# Patient Record
Sex: MALE | Race: BLACK OR AFRICAN AMERICAN | URBAN - METROPOLITAN AREA
[De-identification: names, ages, dates, MRNs, and addresses within clinical notes are randomized per-mention and may not be internally consistent; named-entity substitution may affect disease eponyms.]

---

## 2017-05-10 ENCOUNTER — HOSPITAL ENCOUNTER (EMERGENCY)
Facility: CLINIC | Age: 37
Discharge: HOME | End: 2017-05-10
Attending: EMERGENCY MEDICINE | Admitting: EMERGENCY MEDICINE

## 2017-05-10 VITALS
HEART RATE: 80 BPM | DIASTOLIC BLOOD PRESSURE: 70 MMHG | TEMPERATURE: 96.7 F | SYSTOLIC BLOOD PRESSURE: 121 MMHG | RESPIRATION RATE: 18 BRPM | OXYGEN SATURATION: 97 %

## 2017-05-10 DIAGNOSIS — E11.65 TYPE 2 DIABETES MELLITUS WITH HYPERGLYCEMIA, WITHOUT LONG-TERM CURRENT USE OF INSULIN (H): ICD-10-CM

## 2017-05-10 LAB
ALBUMIN SERPL-MCNC: 3.8 G/DL (ref 3.4–5)
ALP SERPL-CCNC: 102 U/L (ref 40–150)
ALT SERPL W P-5'-P-CCNC: 32 U/L (ref 0–70)
ANION GAP SERPL CALCULATED.3IONS-SCNC: 8 MMOL/L (ref 3–14)
AST SERPL W P-5'-P-CCNC: 21 U/L (ref 0–45)
BASOPHILS # BLD AUTO: 0 10E9/L (ref 0–0.2)
BASOPHILS NFR BLD AUTO: 0.4 %
BILIRUB SERPL-MCNC: 0.6 MG/DL (ref 0.2–1.3)
BUN SERPL-MCNC: 15 MG/DL (ref 7–30)
CALCIUM SERPL-MCNC: 9.3 MG/DL (ref 8.5–10.1)
CHLORIDE SERPL-SCNC: 99 MMOL/L (ref 94–109)
CO2 SERPL-SCNC: 29 MMOL/L (ref 20–32)
CREAT SERPL-MCNC: 0.91 MG/DL (ref 0.66–1.25)
DIFFERENTIAL METHOD BLD: ABNORMAL
EOSINOPHIL # BLD AUTO: 0.1 10E9/L (ref 0–0.7)
EOSINOPHIL NFR BLD AUTO: 1.4 %
ERYTHROCYTE [DISTWIDTH] IN BLOOD BY AUTOMATED COUNT: 13.2 % (ref 10–15)
GFR SERPL CREATININE-BSD FRML MDRD: ABNORMAL ML/MIN/1.7M2
GLUCOSE BLDC GLUCOMTR-MCNC: 268 MG/DL (ref 70–99)
GLUCOSE SERPL-MCNC: 284 MG/DL (ref 70–99)
HCT VFR BLD AUTO: 40.5 % (ref 40–53)
HGB BLD-MCNC: 13.8 G/DL (ref 13.3–17.7)
IMM GRANULOCYTES # BLD: 0 10E9/L (ref 0–0.4)
IMM GRANULOCYTES NFR BLD: 0.2 %
LYMPHOCYTES # BLD AUTO: 2.4 10E9/L (ref 0.8–5.3)
LYMPHOCYTES NFR BLD AUTO: 46.6 %
MCH RBC QN AUTO: 26.7 PG (ref 26.5–33)
MCHC RBC AUTO-ENTMCNC: 34.1 G/DL (ref 31.5–36.5)
MCV RBC AUTO: 78 FL (ref 78–100)
MONOCYTES # BLD AUTO: 0.4 10E9/L (ref 0–1.3)
MONOCYTES NFR BLD AUTO: 7.8 %
NEUTROPHILS # BLD AUTO: 2.3 10E9/L (ref 1.6–8.3)
NEUTROPHILS NFR BLD AUTO: 43.6 %
NRBC # BLD AUTO: 0 10*3/UL
NRBC BLD AUTO-RTO: 0 /100
PLATELET # BLD AUTO: 98 10E9/L (ref 150–450)
POTASSIUM SERPL-SCNC: 3.8 MMOL/L (ref 3.4–5.3)
PROT SERPL-MCNC: 8.4 G/DL (ref 6.8–8.8)
RBC # BLD AUTO: 5.17 10E12/L (ref 4.4–5.9)
SODIUM SERPL-SCNC: 136 MMOL/L (ref 133–144)
TROPONIN I SERPL-MCNC: NORMAL UG/L (ref 0–0.04)
WBC # BLD AUTO: 5.2 10E9/L (ref 4–11)

## 2017-05-10 PROCEDURE — 85025 COMPLETE CBC W/AUTO DIFF WBC: CPT | Performed by: EMERGENCY MEDICINE

## 2017-05-10 PROCEDURE — 99284 EMERGENCY DEPT VISIT MOD MDM: CPT

## 2017-05-10 PROCEDURE — 80053 COMPREHEN METABOLIC PANEL: CPT | Performed by: EMERGENCY MEDICINE

## 2017-05-10 PROCEDURE — 84484 ASSAY OF TROPONIN QUANT: CPT | Performed by: EMERGENCY MEDICINE

## 2017-05-10 PROCEDURE — 00000146 ZZHCL STATISTIC GLUCOSE BY METER IP

## 2017-05-10 PROCEDURE — 25000128 H RX IP 250 OP 636: Performed by: EMERGENCY MEDICINE

## 2017-05-10 PROCEDURE — 93005 ELECTROCARDIOGRAM TRACING: CPT

## 2017-05-10 RX ORDER — LIDOCAINE 40 MG/G
CREAM TOPICAL
Status: DISCONTINUED | OUTPATIENT
Start: 2017-05-10 | End: 2017-05-11 | Stop reason: HOSPADM

## 2017-05-10 RX ORDER — SODIUM CHLORIDE 9 MG/ML
1000 INJECTION, SOLUTION INTRAVENOUS CONTINUOUS
Status: DISCONTINUED | OUTPATIENT
Start: 2017-05-10 | End: 2017-05-11 | Stop reason: HOSPADM

## 2017-05-10 RX ORDER — METFORMIN HCL 500 MG
500 TABLET, EXTENDED RELEASE 24 HR ORAL 2 TIMES DAILY WITH MEALS
Qty: 60 TABLET | Refills: 1 | Status: SHIPPED | OUTPATIENT
Start: 2017-05-10

## 2017-05-10 RX ADMIN — SODIUM CHLORIDE 1000 ML: 9 INJECTION, SOLUTION INTRAVENOUS at 21:58

## 2017-05-10 ASSESSMENT — ENCOUNTER SYMPTOMS
SHORTNESS OF BREATH: 0
DIZZINESS: 1
WEAKNESS: 1
ENDOCRINE COMMENTS: HYPERGLYCEMIA
FATIGUE: 1

## 2017-05-10 NOTE — ED AVS SNAPSHOT
[facility] Emergency Department    [address]    [facility] [address]    Phone:  [phone]    Fax:  [phone]                                       Magdi Reid   MRN: [id number]    Department:  [facility] Emergency Department   Date of Visit:  5/10/2017           Patient Information     Date Of Birth          1980        Your diagnoses for this visit were:     Type 2 diabetes mellitus with hyperglycemia, without long-term current use of insulin (H)        You were seen by Wei Wright MD.      Follow-up Information     Follow up with Primary care clinic.    Why:  for re-evaluation of your symptoms next available        Discharge Instructions         Exercise to Manage Your Blood Sugar  Being physically active every day can help you manage your blood sugar. That s because an active lifestyle can improve your body s ability to use insulin. Daily activity can also help delay or prevent complications of diabetes. And it s a great way to relieve stress. If you aren t normally active, be sure to consult your health care provider before getting started.    How Much Activity Do You Need?  If daily activity is new to you, start slow and steady. Begin with 10 minutes of activity each day. Then work up to at least 40 minutes of moderate to high intensity physical activity on at least 3 to 4 days each week. Do this by adding a few minutes each week. It doesn t have to be done all at once. Each active period throughout the day adds up.  Just Move!  You don t have to join a gym or own pricey sports equipment. Just get out and walk. Walking is an aerobic exercise that makes your heart and lungs work hard. It helps your heart and blood vessels. Walking requires only a sturdy pair of sneakers and your own two feet. The more you walk, the easier it gets.    Schedule time every day to move your feet.    Make it part of your daily routine.    Walk with a friend or a  group to keep it interesting and fun.    Try taking several short walks during the day to meet your daily activity goal.  A Pedometer Makes Every Step Count  A pedometer is a small device that keeps track of how many steps you take. You can clip it to your belt (or a strap on your arm or leg) and go about your daily routine. At the end of the day, the pedometer shows the total number of steps you took. Use a pedometer to set daily goals for yourself. For instance, if you walk 4,000 steps a day, try adding 200 more steps each day. Aim for a goal of 7,500. With every step, you re doing a little more to help your body use insulin.   Adding Resistance Exercise  Resistance exercise (also called strength training), makes muscles stronger. It also helps muscles use insulin better. Ask your health care provider whether this type of exercise is right for you. If it is, your health care provider can help you work it in to your activity plan.  Staying Safe  Being active may cause blood sugar to drop faster than usual. This is especially true if you take medication to manage your blood sugar. But there are things you can do to help reduce the risk of accidental lows. Keep these tips in mind:    Always carry identification when you exercise outside your home. Carry a cell phone to use in case of emergency.    If you can, include friends and family in your activities.    Wear a medical ID bracelet that says you have diabetes.    Use the right safety equipment for the activity you do (such as a bicycle helmet when you ride a bicycle outdoors). Wear closed-toed shoes that fit your feet well.    Drink plenty of water before and during activity.    Keep a fast-acting sugar (such as glucose tablets) on hand in case of low blood sugar.    Dress properly for the weather. Wear a hat if it s shira, or wait until evening if it s too hot.    Avoid being active for long periods in very hot or very cold weather.    Skip activity if you re  sick.     Notice How Activity Affects Blood Sugar  Physical activity is important when you have diabetes. But you need to keep an eye on your blood sugar level. Check often if you have been active for longer than usual, or if the activity was unplanned. Make it a habit to check your blood sugar before being active. And check again a few hours later. Use your log book to write down how activity affects your numbers. If you take insulin, you may be able to adjust your dose before a planned activity. This can help prevent lows. Talk to your health care provider to learn more.        0733-0527 "[employer]. [address] [place] [address]. All rights reserved. This information is not intended as a substitute for professional medical care. Always follow your healthcare professional's instructions.          Using a Blood Sugar Log  You have diabetes. This means your body has trouble regulating a sugar called glucose. To help manage your diabetes, you ll need to check your blood sugar level as directed by your health care provider. Keeping a log of your blood sugar levels will help you track your blood sugar readings. It s a simple and easy way to see how well you are controlling your diabetes.    Checking your blood sugar level  You can check your blood sugar level with a blood glucose meter. You ll first use a tiny lancet to draw a tiny drop of blood from the side of a finger. Some glucose meters let you use another place on your body to test. But these other places should not be used in some cases. Follow the instructions for your glucose meter. And talk with your health care provider before doing the test on other places.  The strip goes into the meter first, then a drop of blood is placed on the tip of the strip. The meter then shows a reading that tells you the level of your blood sugar. Your readings should be in your target range as often as possible. This means not too high or too low.  Staying in this range helps lower your risk for complications. Your doctor will help you figure out the target range that is best for you.  Tracking your readings  Every time you check your blood sugar, use your log to keep track of your readings. You may be advised by your doctor to check your blood sugar in the morning, at bedtime, and before and after meals. Be sure to write down all of your numbers. Also use your log to record things that might have affected your blood sugar. Some examples include being sick, being physically active, feeling stressed, or skipping meals.  Lessons learned from your readings  Tracking your blood sugar readings helps you see patterns. These patterns tell you how your actions affect your blood sugar. For instance, you may have higher numbers after eating certain foods or lower numbers after exercise. Keep in mind that there are no  good  or  bad  numbers. They just help you understand how to stay in your target range more often, so that your diabetes remains in good control.  Sharing your log with your health care team  Bring your blood sugar log with you to all of your health care appointments. It can help your doctor and other members of your health care team make changes to your treatment plan, if needed. This may involve making changes in what you eat, what medicines you take, or how much you exercise.  To learn more  The resources below can help you learn more:    American Diabetes Association [phone] www.diabetes.org    [employer] [phone] www.lighthouse.org    [facility] [phone] www.nei.nih.gov    [facility] [phone]    2370-0861 [employer]. [address] [place] [address], [place] [address]. All rights reserved. This information is not intended as a substitute for professional medical care. Always follow your healthcare professional's instructions.          24 Hour Appointment Hotline       To make an appointment  at any [facility], call [phone] ([phone]). If you don't have a family doctor or clinic, we will help you find one. [facility] are conveniently located to serve the needs of you and your family.             Review of your medicines      START taking        Dose / Directions Last dose taken    metFORMIN 500 MG 24 hr tablet   Commonly known as:  GLUCOPHAGE-XR   Dose:  500 mg   Quantity:  60 tablet        Take 1 tablet (500 mg) by mouth 2 times daily (with meals)   Refills:  1                Prescriptions were sent or printed at these locations (1 Prescription)                   Other Prescriptions                Printed at Department/Unit printer (1 of 1)         metFORMIN (GLUCOPHAGE-XR) 500 MG 24 hr tablet                Procedures and tests performed during your visit     CBC with platelets differential    Comprehensive metabolic panel    EKG 12 lead    Glucose by meter    Glucose monitor nursing POCT    Peripheral IV catheter    Troponin I      Orders Needing Specimen Collection     None      Pending Results     Date and Time Order Name Status Description    5/10/2017 1956 EKG 12 lead Preliminary             Pending Culture Results     No orders found from 5/8/2017 to 5/11/2017.            Pending Results Instructions     If you had any lab results that were not finalized at the time of your Discharge, you can call the ED Lab Result RN at [phone]. You will be contacted by this team for any positive Lab results or changes in treatment. The nurses are available 7 days a week from 10A to 6:30P.  You can leave a message 24 hours per day and they will return your call.        Test Results From Your Hospital Stay        5/10/2017  7:51 PM      Component Results     Component Value Ref Range & Units Status    Glucose 268 (H) 70 - 99 mg/dL Final         5/10/2017  9:19 PM      Component Results     Component Value Ref Range & Units Status    WBC 5.2 4.0 - 11.0 10e9/L Final    RBC Count 5.17  4.4 - 5.9 10e12/L Final    Hemoglobin 13.8 13.3 - 17.7 g/dL Final    Hematocrit 40.5 40.0 - 53.0 % Final    MCV 78 78 - 100 fl Final    MCH 26.7 26.5 - 33.0 pg Final    MCHC 34.1 31.5 - 36.5 g/dL Final    RDW 13.2 10.0 - 15.0 % Final    Platelet Count 98 (L) 150 - 450 10e9/L Final    Diff Method Automated Method  Final    % Neutrophils 43.6 % Final    % Lymphocytes 46.6 % Final    % Monocytes 7.8 % Final    % Eosinophils 1.4 % Final    % Basophils 0.4 % Final    % Immature Granulocytes 0.2 % Final    Nucleated RBCs 0 0 /100 Final    Absolute Neutrophil 2.3 1.6 - 8.3 10e9/L Final    Absolute Lymphocytes 2.4 0.8 - 5.3 10e9/L Final    Absolute Monocytes 0.4 0.0 - 1.3 10e9/L Final    Absolute Eosinophils 0.1 0.0 - 0.7 10e9/L Final    Absolute Basophils 0.0 0.0 - 0.2 10e9/L Final    Abs Immature Granulocytes 0.0 0 - 0.4 10e9/L Final    Absolute Nucleated RBC 0.0  Final         5/10/2017  9:40 PM      Component Results     Component Value Ref Range & Units Status    Sodium 136 133 - 144 mmol/L Final    Potassium 3.8 3.4 - 5.3 mmol/L Final    Chloride 99 94 - 109 mmol/L Final    Carbon Dioxide 29 20 - 32 mmol/L Final    Anion Gap 8 3 - 14 mmol/L Final    Glucose 284 (H) 70 - 99 mg/dL Final    Urea Nitrogen 15 7 - 30 mg/dL Final    Creatinine 0.91 0.66 - 1.25 mg/dL Final    GFR Estimate >90  Non  GFR Calc   >60 mL/min/1.7m2 Final    GFR Estimate If Black >90   GFR Calc   >60 mL/min/1.7m2 Final    Calcium 9.3 8.5 - 10.1 mg/dL Final    Bilirubin Total 0.6 0.2 - 1.3 mg/dL Final    Albumin 3.8 3.4 - 5.0 g/dL Final    Protein Total 8.4 6.8 - 8.8 g/dL Final    Alkaline Phosphatase 102 40 - 150 U/L Final    ALT 32 0 - 70 U/L Final    AST 21 0 - 45 U/L Final         5/10/2017  9:39 PM      Component Results     Component Value Ref Range & Units Status    Troponin I ES  0.000 - 0.045 ug/L Final    <0.015  The 99th percentile for upper reference range is 0.045 ug/L.  Troponin values in   the range of  0.045 - 0.120 ug/L may be associated with risks of adverse   clinical events.                  Clinical Quality Measure: Blood Pressure Screening     Your blood pressure was checked while you were in the emergency department today. The last reading we obtained was  BP: (!) 134/94 . Please read the guidelines below about what these numbers mean and what you should do about them.  If your systolic blood pressure (the top number) is less than 120 and your diastolic blood pressure (the bottom number) is less than 80, then your blood pressure is normal. There is nothing more that you need to do about it.  If your systolic blood pressure (the top number) is 120-139 or your diastolic blood pressure (the bottom number) is 80-89, your blood pressure may be higher than it should be. You should have your blood pressure rechecked within a year by a primary care provider.  If your systolic blood pressure (the top number) is 140 or greater or your diastolic blood pressure (the bottom number) is 90 or greater, you may have high blood pressure. High blood pressure is treatable, but if left untreated over time it can put you at risk for heart attack, stroke, or kidney failure. You should have your blood pressure rechecked by a primary care provider within the next 4 weeks.  If your provider in the emergency department today gave you specific instructions to follow-up with your doctor or provider even sooner than that, you should follow that instruction and not wait for up to 4 weeks for your follow-up visit.        Thank you for choosing [facility]       Thank you for choosing [facility] for your care. Our goal is always to provide you with excellent care. Hearing back from our patients is one way we can continue to improve our services. Please take a few minutes to complete the written survey that you may receive in the mail after you visit with us. Thank you!        [employer] Information     [employer] lets you send messages to your  "doctor, view your test results, renew your prescriptions, schedule appointments and more. To sign up, go to [email or web address] . Click on \"Log in\" on the left side of the screen, which will take you to the Welcome page. Then click on \"Sign up Now\" on the right side of the page.     You will be asked to enter the access code listed below, as well as some personal information. Please follow the directions to create your username and password.     Your access code is: [email or web address]  Expires: 2017 10:12 PM     Your access code will  in 90 days. If you need help or a new code, please call your [facility] clinic or [phone].        Care EveryWhere ID     This is your Care EveryWhere ID. This could be used by other organizations to access your [facility] medical records  [id number]        After Visit Summary       This is your record. Keep this with you and show to your community pharmacist(s) and doctor(s) at your next visit.                  "

## 2017-05-10 NOTE — ED AVS SNAPSHOT
[facility] Emergency Department    [address]    [facility] [address]    Phone:  [phone]    Fax:  [phone]                                       Magdi Reid   MRN: [id number]    Department:  [facility] Emergency Department   Date of Visit:  5/10/2017           After Visit Summary Signature Page     I have received my discharge instructions, and my questions have been answered. I have discussed any challenges I see with this plan with the nurse or doctor.    ..........................................................................................................................................  Patient/Patient Representative Signature      ..........................................................................................................................................  Patient Representative Print Name and Relationship to Patient    ..................................................               ................................................  Date                                            Time    ..........................................................................................................................................  Reviewed by Signature/Title    ...................................................              ..............................................  Date                                                            Time

## 2017-05-11 LAB — INTERPRETATION ECG - MUSE: NORMAL

## 2017-05-11 NOTE — DISCHARGE INSTRUCTIONS
Exercise to Manage Your Blood Sugar  Being physically active every day can help you manage your blood sugar. That s because an active lifestyle can improve your body s ability to use insulin. Daily activity can also help delay or prevent complications of diabetes. And it s a great way to relieve stress. If you aren t normally active, be sure to consult your health care provider before getting started.    How Much Activity Do You Need?  If daily activity is new to you, start slow and steady. Begin with 10 minutes of activity each day. Then work up to at least 40 minutes of moderate to high intensity physical activity on at least 3 to 4 days each week. Do this by adding a few minutes each week. It doesn t have to be done all at once. Each active period throughout the day adds up.  Just Move!  You don t have to join a gym or own pricey sports equipment. Just get out and walk. Walking is an aerobic exercise that makes your heart and lungs work hard. It helps your heart and blood vessels. Walking requires only a sturdy pair of sneakers and your own two feet. The more you walk, the easier it gets.    Schedule time every day to move your feet.    Make it part of your daily routine.    Walk with a friend or a group to keep it interesting and fun.    Try taking several short walks during the day to meet your daily activity goal.  A Pedometer Makes Every Step Count  A pedometer is a small device that keeps track of how many steps you take. You can clip it to your belt (or a strap on your arm or leg) and go about your daily routine. At the end of the day, the pedometer shows the total number of steps you took. Use a pedometer to set daily goals for yourself. For instance, if you walk 4,000 steps a day, try adding 200 more steps each day. Aim for a goal of 7,500. With every step, you re doing a little more to help your body use insulin.   Adding Resistance Exercise  Resistance exercise (also called strength training), makes  muscles stronger. It also helps muscles use insulin better. Ask your health care provider whether this type of exercise is right for you. If it is, your health care provider can help you work it in to your activity plan.  Staying Safe  Being active may cause blood sugar to drop faster than usual. This is especially true if you take medication to manage your blood sugar. But there are things you can do to help reduce the risk of accidental lows. Keep these tips in mind:    Always carry identification when you exercise outside your home. Carry a cell phone to use in case of emergency.    If you can, include friends and family in your activities.    Wear a medical ID bracelet that says you have diabetes.    Use the right safety equipment for the activity you do (such as a bicycle helmet when you ride a bicycle outdoors). Wear closed-toed shoes that fit your feet well.    Drink plenty of water before and during activity.    Keep a fast-acting sugar (such as glucose tablets) on hand in case of low blood sugar.    Dress properly for the weather. Wear a hat if it s shira, or wait until evening if it s too hot.    Avoid being active for long periods in very hot or very cold weather.    Skip activity if you re sick.     Notice How Activity Affects Blood Sugar  Physical activity is important when you have diabetes. But you need to keep an eye on your blood sugar level. Check often if you have been active for longer than usual, or if the activity was unplanned. Make it a habit to check your blood sugar before being active. And check again a few hours later. Use your log book to write down how activity affects your numbers. If you take insulin, you may be able to adjust your dose before a planned activity. This can help prevent lows. Talk to your health care provider to learn more.        1839-6032 [employer]. [address] [place] [address], [place] [address]. All rights reserved. This information is not intended as a  substitute for professional medical care. Always follow your healthcare professional's instructions.          Using a Blood Sugar Log  You have diabetes. This means your body has trouble regulating a sugar called glucose. To help manage your diabetes, you ll need to check your blood sugar level as directed by your health care provider. Keeping a log of your blood sugar levels will help you track your blood sugar readings. It s a simple and easy way to see how well you are controlling your diabetes.    Checking your blood sugar level  You can check your blood sugar level with a blood glucose meter. You ll first use a tiny lancet to draw a tiny drop of blood from the side of a finger. Some glucose meters let you use another place on your body to test. But these other places should not be used in some cases. Follow the instructions for your glucose meter. And talk with your health care provider before doing the test on other places.  The strip goes into the meter first, then a drop of blood is placed on the tip of the strip. The meter then shows a reading that tells you the level of your blood sugar. Your readings should be in your target range as often as possible. This means not too high or too low. Staying in this range helps lower your risk for complications. Your doctor will help you figure out the target range that is best for you.  Tracking your readings  Every time you check your blood sugar, use your log to keep track of your readings. You may be advised by your doctor to check your blood sugar in the morning, at bedtime, and before and after meals. Be sure to write down all of your numbers. Also use your log to record things that might have affected your blood sugar. Some examples include being sick, being physically active, feeling stressed, or skipping meals.  Lessons learned from your readings  Tracking your blood sugar readings helps you see patterns. These patterns tell you how your actions affect your  blood sugar. For instance, you may have higher numbers after eating certain foods or lower numbers after exercise. Keep in mind that there are no  good  or  bad  numbers. They just help you understand how to stay in your target range more often, so that your diabetes remains in good control.  Sharing your log with your health care team  Bring your blood sugar log with you to all of your health care appointments. It can help your doctor and other members of your health care team make changes to your treatment plan, if needed. This may involve making changes in what you eat, what medicines you take, or how much you exercise.  To learn more  The resources below can help you learn more:    American Diabetes Association [phone] www.diabetes.org    [employer] [phone] www.lighthouse.org    [facility] [phone] www.nei.nih.gov    [facility] [phone]    9912-0131 [employer]. [address] [place] [address], [place] [address]. All rights reserved. This information is not intended as a substitute for professional medical care. Always follow your healthcare professional's instructions.

## 2017-05-11 NOTE — ED NOTES
ABC's intact.  Alert and oriented x4.    Pt concerned over past few days he is feeling weak and tired.  His blood sugar machine was reading high (24.6 on UK scale). Blood sugar in triage 268.  Also c/o L sided chest pain about 3 days ago.  Very mild but persistent.

## 2017-05-11 NOTE — ED PROVIDER NOTES
History     Chief Complaint:  Hyperglycemia    HPI:    Magdi Reid is a 37 year old male with a history of type 2 diabetes mellitus who presents with hyperglycemia. The patient was diagnosed with type 2 diabetes mellitus in 2010 and has been controlling it with diet and exercising, checking his blood sugar on a monthly basis. Ten days ago, the patient reports that he was out for a run when he suddenly felt dizzy and collapsed to the ground. After a couple of minutes, he began feeling normal and returned home; he was not evaluated after this. However, three days ago, he began feeling fatigued. Today, he finally checked his blood sugar, noting it to be very high for him at 260, prompting his ED visit. Here, he is primarily concerned about the hyperglycemia affecting his heart, causing his weakness, fatigue, and dizziness. He denies chest pain or shortness of breath.    Allergies:  No known drug allergies      Medications:    The patient is not currently taking any prescribed medications.     Past Medical History:    Type 2 diabetes mellitus    Past Surgical History:    History reviewed. No pertinent surgical history.     Family History:    History reviewed. No pertinent family history.      Social History:  Smoking status: Never Smoker  Alcohol use: Occasionally   Marital Status:    Presents with wife from [facility].  Originally from [place], he and his family arrived in [place], [place] four days ago.     Review of Systems   Constitutional: Positive for fatigue.   Respiratory: Negative for shortness of breath.    Cardiovascular: Negative for chest pain.   Endocrine:        Hyperglycemia   Neurological: Positive for dizziness and weakness.   All other systems reviewed and are negative.      Physical Exam     Patient Vitals for the past 24 hrs:   BP Temp Temp src Pulse Resp SpO2   05/10/17 2145 (!) 134/94 - - - - 98 %   05/10/17 2130 120/81 - - - - 96 %   05/10/17 2115 109/83 - - - - 99 %   05/10/17  2100 126/89 - - - - 96 %   05/10/17 2045 123/86 - - - - 98 %   05/10/17 2030 124/86 - - - - 98 %   05/10/17 2015 (!) 148/105 - - - - 99 %   05/10/17 2007 - - - - - 99 %   05/10/17 2005 136/90 - - - - -   05/10/17 1950 (!) 152/109 96.7  F (35.9  C) Temporal 80 18 99 %      Physical Exam:  General: Patient is alert and cooperative.  HENT: Moist oral mucosa.  Eyes: EOMI, PERRLA.  Normal conjunctiva.  Neck: Normal range of motion. Neck supple.  Cardiovascular: Normal rate, regular rhythm and normal heart sounds.   Pulmonary/Chest: Effort normal.  No wheezing or crackles.  Abdominal: Soft. Patient exhibits no distension and no mass. There is no tenderness.      Musculoskeletal: Normal range of motion. Patient exhibits no edema and no tenderness.   Neurological: Patient  is alert and oriented. Normal motor and sensory examinations.  Skin: Skin is warm and dry. No rash noted.   Psychiatric: Patient has a normal mood and affect. Normal behavior and judgement.      Emergency Department Course     ECG (20:01:10):  Rate 72 bpm. MA interval 174. QRS duration 104. QT/QTc 402/440. P-R-T axes 50-8-4. Normal sinus rhythm. Minimal voltage criteria for LVH, may be normal variant. Borderline ECG. Interpreted at 2005 by Wei Wright MD.     Laboratory:  1948: Glucose by Meter: 268 (H)   CBC: PLT 98 (L), o/w WNL (WBC 5.2, HGB 13.8)    CMP: Glucose 284 (H), o/w WNL (Creatinine 0.91)   Troponin I: <0.015    Interventions:   2158- NS 1L IV Bolus     Emergency Department Course:  An ECG was obtained.    Past medical records, nursing notes, and vitals reviewed.  2013: I performed an exam of the patient and obtained history, as documented above.    IV inserted and blood drawn.     The above intervention was administered.    2200: I rechecked the patient. ECG and laboratory findings and plan explained to the Patient and spouse. Patient discharged home with instructions regarding supportive care, medications, and reasons to return. The  importance of close follow-up was reviewed.      Impression & Plan      Medical Decision Making:    Magdi Reid is a 37 year old male who presents with complaints of hyperglycemia. He has been a type 2 diabetic, managed with diet and exercise, for the past several years. He is complaining of symptoms of generalized fatigue, leading him to check his blood sugar. When this was elevated, he was prompted to visit the ED. He has had no significant polydipsia or polyuria, nor any recent fevers. He has a normal physical exam. His laboratory testing confirms hyperglycemia, as his serum glucose is 283. His electrolytes are normal. His ECG, troponin, and CBC are all normal as well. He just moved to [place] from [place] and will need to establish primary care. A referral for that was provided and I prescribed a starting dose of Metformin with the understanding that this will likely need to be titrated upwards with the guidance of his new primary care provider. There is no evidence of an occult infectious trigger, nor any indication for any additional testing or treatment.     IMPRESSION:  1. Hyperglycemia secondary to poorly controlled type 2 diabetes    Diagnosis:    ICD-10-CM   1. Type 2 diabetes mellitus with hyperglycemia, without long-term current use of insulin (H) E11.65     Disposition:  Discharged to home with Metformin.    Discharge Medications:  New Prescriptions    METFORMIN (GLUCOPHAGE-XR) 500 MG 24 HR TABLET    Take 1 tablet (500 mg) by mouth 2 times daily (with meals)     Colette Keith  5/10/2017   [facility] EMERGENCY DEPARTMENT    I, Colette Keith, am serving as a scribe at 8:13 PM on 5/10/2017 to document services personally performed by Wei Wright MD based on my observations and the provider's statements to me.         Wei Wright MD  05/11/17 6015